# Patient Record
Sex: MALE | Race: BLACK OR AFRICAN AMERICAN | Employment: FULL TIME | URBAN - METROPOLITAN AREA
[De-identification: names, ages, dates, MRNs, and addresses within clinical notes are randomized per-mention and may not be internally consistent; named-entity substitution may affect disease eponyms.]

---

## 2021-08-09 ENCOUNTER — OFFICE VISIT (OUTPATIENT)
Dept: FAMILY MEDICINE CLINIC | Age: 41
End: 2021-08-09
Payer: COMMERCIAL

## 2021-08-09 VITALS
WEIGHT: 220 LBS | BODY MASS INDEX: 25.45 KG/M2 | DIASTOLIC BLOOD PRESSURE: 77 MMHG | TEMPERATURE: 98.7 F | OXYGEN SATURATION: 97 % | HEIGHT: 78 IN | HEART RATE: 82 BPM | SYSTOLIC BLOOD PRESSURE: 115 MMHG | RESPIRATION RATE: 16 BRPM

## 2021-08-09 DIAGNOSIS — Z76.89 ENCOUNTER TO ESTABLISH CARE: Primary | ICD-10-CM

## 2021-08-09 DIAGNOSIS — Z23 ENCOUNTER FOR IMMUNIZATION: ICD-10-CM

## 2021-08-09 DIAGNOSIS — K63.5 POLYP OF COLON, UNSPECIFIED PART OF COLON, UNSPECIFIED TYPE: ICD-10-CM

## 2021-08-09 DIAGNOSIS — Z11.59 NEED FOR HEPATITIS C SCREENING TEST: ICD-10-CM

## 2021-08-09 DIAGNOSIS — M25.461 SWELLING OF JOINT, KNEE, RIGHT: ICD-10-CM

## 2021-08-09 DIAGNOSIS — E55.9 VITAMIN D DEFICIENCY: ICD-10-CM

## 2021-08-09 DIAGNOSIS — B36.0 TINEA VERSICOLOR: ICD-10-CM

## 2021-08-09 PROCEDURE — 99204 OFFICE O/P NEW MOD 45 MIN: CPT | Performed by: FAMILY MEDICINE

## 2021-08-09 PROCEDURE — 90715 TDAP VACCINE 7 YRS/> IM: CPT | Performed by: FAMILY MEDICINE

## 2021-08-09 PROCEDURE — 90471 IMMUNIZATION ADMIN: CPT | Performed by: FAMILY MEDICINE

## 2021-08-09 RX ORDER — TRIAMCINOLONE ACETONIDE 55 UG/1
2 SPRAY, METERED NASAL DAILY
COMMUNITY
End: 2022-01-03

## 2021-08-09 RX ORDER — KETOCONAZOLE 20 MG/ML
SHAMPOO TOPICAL
Qty: 1 BOTTLE | Refills: 1 | Status: SHIPPED | OUTPATIENT
Start: 2021-08-09

## 2021-08-09 RX ORDER — LORATADINE 10 MG/1
10 TABLET ORAL
COMMUNITY
End: 2022-01-03

## 2021-08-09 NOTE — PROGRESS NOTES
1. Have you been to the ER, urgent care clinic since your last visit? Hospitalized since your last visit? No    2. Have you seen or consulted any other health care providers outside of the [address] [place] [address] since your last visit? Include any pap smears or colon screening.  No

## 2021-08-09 NOTE — PROGRESS NOTES
Ayde Sheffield is a 46 y.o. male who presents to the office today with the following:  Chief Complaint   Patient presents with   [address]     IBS not under control, refer to Gastroenterologist due to polyps, refer to dermatologist for fungus on skin, refer to PT for right knee, lived in [facility] for last 4 years and had speratic health care there       HPI   Establish care  BW  Not fasting today    Had Vit D def in the past    IBS  And Hx of colon polyps  Needs Colonoscopy every year    Right knee with decreased mobility since patellar tendon tear  And swelling  Can not run or jump  Wants to see Ortho first before PT    Rash  Tinea versicolor  Prev treated with shampoo      Review of Systems   Constitutional: Negative for weight loss. HENT: Negative for congestion. Eyes: Negative for blurred vision. Respiratory: Negative for cough. Cardiovascular: Negative for chest pain. Gastrointestinal: Positive for abdominal pain (little d/t IBS) and constipation. Negative for blood in stool, diarrhea and melena. Genitourinary: Negative. Negative for frequency. Skin: Positive for rash. Endo/Heme/Allergies: Negative for polydipsia. See HPI. Past Medical History:   Diagnosis Date    Colon polyps     H/O seasonal allergies     IBS (irritable bowel syndrome)     Patellar tendon rupture 05/2017    Tinea versicolor        Past Surgical History:   Procedure Laterality Date    HX COLONOSCOPY  08/2020    4 in the past 4 years due to polyps    HX ORTHOPAEDIC Right 2017    patellar tendon    HX UROLOGICAL  08/13/2013    vasectomy    HX UROLOGICAL      multiple benign lesion removed from Scrotum       No Known Allergies    Current Outpatient Medications   Medication Sig    loratadine (Claritin) 10 mg tablet Take 10 mg by mouth.  triamcinolone (NASACORT AQ) 55 mcg nasal inhaler 2 Sprays daily.     ketoconazole (NIZORAL) 2 % shampoo Apply daily for 10 min daily for a week then prn     No current facility-administered medications for this visit. Social History     Socioeconomic History    Marital status:      Spouse name: Not on file    Number of children: Not on file    Years of education: Not on file    Highest education level: Not on file   Tobacco Use    Smoking status: Never Smoker    Smokeless tobacco: Never Used   Vaping Use    Vaping Use: Never used   Substance and Sexual Activity    Alcohol use: Not Currently    Drug use: Not Currently     Types: Marijuana    Sexual activity: Yes     Partners: Female     Social Determinants of Health     Financial Resource Strain:     Difficulty of Paying Living Expenses:    Food Insecurity:     Worried About Running Out of Food in the Last Year:     [address] in the Last Year:    Transportation Needs:     Lack of Transportation (Medical):      Lack of Transportation (Non-Medical):    Physical Activity:     Days of Exercise per Week:     Minutes of Exercise per Session:    Stress:     Feeling of Stress :    Social Connections:     Frequency of Communication with Friends and Family:     Frequency of Social Gatherings with Friends and Family:     Attends [employer] Services:     Active Member of Clubs or Organizations:     Attends Club or Organization Meetings:     Marital Status:        Family History   Problem Relation Age of Onset    Cancer Mother         breast    No Known Problems Father     No Known Problems Sister     No Known Problems Brother     Hypertension Maternal Grandmother     Cancer Maternal Grandfather         colon    No Known Problems Paternal Grandmother     Cancer Paternal Grandfather         prostate         Physical Exam:  Visit Vitals  /77 (BP 1 Location: Left upper arm, BP Patient Position: Sitting, BP Cuff Size: Large adult)   Pulse 82   Temp 98.7 °F (37.1 °C) (Oral)   Resp 16   Ht 6' 6\" (1.981 m)   Wt 220 lb (99.8 kg)   SpO2 97%   BMI 25.42 kg/m²     Physical Exam  Vitals and nursing note reviewed. Constitutional:       Appearance: He is normal weight. HENT:      Head: Normocephalic and atraumatic. Right Ear: Tympanic membrane, ear canal and external ear normal.      Left Ear: Tympanic membrane, ear canal and external ear normal.   Eyes:      Extraocular Movements: Extraocular movements intact. Conjunctiva/sclera: Conjunctivae normal.      Pupils: Pupils are equal, round, and reactive to light. Cardiovascular:      Rate and Rhythm: Normal rate and regular rhythm. Pulses: Normal pulses. Heart sounds: Normal heart sounds. Pulmonary:      Effort: Pulmonary effort is normal.      Breath sounds: Normal breath sounds. Abdominal:      General: Abdomen is flat. There is no distension. Palpations: Abdomen is soft. Tenderness: There is no abdominal tenderness. There is no right CVA tenderness, left CVA tenderness or guarding. Musculoskeletal:         General: Swelling present. Right lower leg: No edema. Left lower leg: No edema. Comments: Right knee with mild swelling, no redness, crepitus present , decreased flexion   Lymphadenopathy:      Cervical: No cervical adenopathy. Skin:     General: Skin is warm and dry. Neurological:      Mental Status: He is alert and oriented to person, place, and time. Psychiatric:         Mood and Affect: Mood normal.         Behavior: Behavior normal.         Assessment/Plan:    ICD-10-CM ICD-9-CM    1. Encounter to establish care  Z76.89 V65.8 LIPID PANEL      CBC WITH AUTOMATED DIFF      METABOLIC PANEL, COMPREHENSIVE   2. Polyp of colon, unspecified part of colon, unspecified type  K63.5 211.3 REFERRAL TO GASTROENTEROLOGY  Advised to increase fiber in diet   3. Tinea versicolor  B36.0 111.0 REFERRAL TO DERMATOLOGY      ketoconazole (NIZORAL) 2 % shampoo   4. Swelling of joint, knee, right  M25.461 719.06 REFERRAL TO ORTHOPEDICS   5. Need for hepatitis C screening test  Z11.59 V73.89 HEPATITIS C AB   6. Vitamin D deficiency  E55.9 268.9 VITAMIN D, 25 HYDROXY   7.  Encounter for immunization  Z23 V03.89 TETANUS, DIPHTHERIA TOXOIDS AND ACELLULAR PERTUSSIS VACCINE (TDAP), IN INDIVIDS. >=7, IM           Mehul Vásquez MD

## 2021-08-10 LAB
25(OH)D3 SERPL-MCNC: 25.2 NG/ML (ref 30–100)
ALBUMIN SERPL-MCNC: 4.3 G/DL (ref 3.5–5)
ALBUMIN/GLOB SERPL: 1.3 {RATIO} (ref 1.1–2.2)
ALP SERPL-CCNC: 76 U/L (ref 45–117)
ALT SERPL-CCNC: 28 U/L (ref 12–78)
ANION GAP SERPL CALC-SCNC: 3 MMOL/L (ref 5–15)
AST SERPL-CCNC: 28 U/L (ref 15–37)
BASOPHILS # BLD: 0 K/UL (ref 0–0.1)
BASOPHILS NFR BLD: 1 % (ref 0–1)
BILIRUB SERPL-MCNC: 1 MG/DL (ref 0.2–1)
BUN SERPL-MCNC: 12 MG/DL (ref 6–20)
BUN/CREAT SERPL: 10 (ref 12–20)
CALCIUM SERPL-MCNC: 9.4 MG/DL (ref 8.5–10.1)
CHLORIDE SERPL-SCNC: 102 MMOL/L (ref 97–108)
CHOLEST SERPL-MCNC: 237 MG/DL
CO2 SERPL-SCNC: 33 MMOL/L (ref 21–32)
CREAT SERPL-MCNC: 1.21 MG/DL (ref 0.7–1.3)
DIFFERENTIAL METHOD BLD: ABNORMAL
EOSINOPHIL # BLD: 0.4 K/UL (ref 0–0.4)
EOSINOPHIL NFR BLD: 6 % (ref 0–7)
ERYTHROCYTE [DISTWIDTH] IN BLOOD BY AUTOMATED COUNT: 12.8 % (ref 11.5–14.5)
GLOBULIN SER CALC-MCNC: 3.3 G/DL (ref 2–4)
GLUCOSE SERPL-MCNC: 104 MG/DL (ref 65–100)
HCT VFR BLD AUTO: 51.4 % (ref 36.6–50.3)
HCV AB SERPL QL IA: NONREACTIVE
HDLC SERPL-MCNC: 62 MG/DL
HDLC SERPL: 3.8 {RATIO} (ref 0–5)
HGB BLD-MCNC: 16 G/DL (ref 12.1–17)
IMM GRANULOCYTES # BLD AUTO: 0 K/UL (ref 0–0.04)
IMM GRANULOCYTES NFR BLD AUTO: 0 % (ref 0–0.5)
LDLC SERPL CALC-MCNC: 152 MG/DL (ref 0–100)
LYMPHOCYTES # BLD: 1.8 K/UL (ref 0.8–3.5)
LYMPHOCYTES NFR BLD: 24 % (ref 12–49)
MCH RBC QN AUTO: 29.3 PG (ref 26–34)
MCHC RBC AUTO-ENTMCNC: 31.1 G/DL (ref 30–36.5)
MCV RBC AUTO: 94.1 FL (ref 80–99)
MONOCYTES # BLD: 0.5 K/UL (ref 0–1)
MONOCYTES NFR BLD: 6 % (ref 5–13)
NEUTS SEG # BLD: 4.7 K/UL (ref 1.8–8)
NEUTS SEG NFR BLD: 63 % (ref 32–75)
NRBC # BLD: 0 K/UL (ref 0–0.01)
NRBC BLD-RTO: 0 PER 100 WBC
PLATELET # BLD AUTO: 203 K/UL (ref 150–400)
PMV BLD AUTO: 11.5 FL (ref 8.9–12.9)
POTASSIUM SERPL-SCNC: 3.6 MMOL/L (ref 3.5–5.1)
PROT SERPL-MCNC: 7.6 G/DL (ref 6.4–8.2)
RBC # BLD AUTO: 5.46 M/UL (ref 4.1–5.7)
SODIUM SERPL-SCNC: 138 MMOL/L (ref 136–145)
TRIGL SERPL-MCNC: 115 MG/DL (ref ?–150)
VLDLC SERPL CALC-MCNC: 23 MG/DL
WBC # BLD AUTO: 7.4 K/UL (ref 4.1–11.1)

## 2021-08-10 NOTE — PROGRESS NOTES
Call pt, the Hep C is negative  The Vit D is low, take an OTC Vit D 2000 U every day and recheck in 3 mo  The sugar, kidney, liver tests and electrolytes are ok  The CBC is ok  The Chol is elevated, avoid Chol rich foods and recheck in 3 mo

## 2021-08-16 NOTE — PROGRESS NOTES
I have called and talked to this patient. I have given him his lab results and Dr Kerry Olmedo recommendations. Patient verbalizes understanding.

## 2021-12-22 ENCOUNTER — VIRTUAL VISIT (OUTPATIENT)
Dept: FAMILY MEDICINE CLINIC | Age: 41
End: 2021-12-22
Payer: COMMERCIAL

## 2021-12-22 DIAGNOSIS — R05.9 COUGH IN ADULT: Primary | ICD-10-CM

## 2021-12-22 LAB — SARS-COV-2 POC: NEGATIVE

## 2021-12-22 PROCEDURE — 87426 SARSCOV CORONAVIRUS AG IA: CPT | Performed by: FAMILY MEDICINE

## 2021-12-22 PROCEDURE — 99213 OFFICE O/P EST LOW 20 MIN: CPT | Performed by: FAMILY MEDICINE

## 2021-12-22 RX ORDER — AZITHROMYCIN 250 MG/1
TABLET, FILM COATED ORAL
Qty: 6 TABLET | Refills: 0 | Status: SHIPPED | OUTPATIENT
Start: 2021-12-22 | End: 2021-12-27

## 2021-12-22 RX ORDER — FEXOFENADINE HCL AND PSEUDOEPHEDRINE HCI 60; 120 MG/1; MG/1
1 TABLET, EXTENDED RELEASE ORAL EVERY 12 HOURS
COMMUNITY

## 2021-12-22 RX ORDER — BENZONATATE 100 MG/1
100 CAPSULE ORAL
Qty: 21 CAPSULE | Refills: 1 | Status: SHIPPED | OUTPATIENT
Start: 2021-12-22 | End: 2021-12-29

## 2021-12-22 NOTE — PROGRESS NOTES
Thi Ng is a 46 y.o. male who was seen by synchronous (real-time) audio-video technology on 12/22/2021 for Sore Throat (  x 1 day    -    [phone])        Assessment & Plan:   Diagnoses and all orders for this visit:    1. Cough in adult  -     azithromycin (ZITHROMAX) 250 mg tablet; Take 2 tablets today, then take 1 tablet daily  -     benzonatate (TESSALON) 100 mg capsule; Take 1 Capsule by mouth three (3) times daily as needed for Cough for up to 7 days.  -     AMB POC SARS-COV-2      Results for orders placed or performed in visit on 12/22/21   AMB POC SARS-COV-2   Result Value Ref Range    SARS-COV-2 POC Negative Negative       Symptomatic rx. 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 Has had COVID shots including booster. Prior to Admission medications    Medication Sig Start Date End Date Taking? Authorizing Provider   fexofenadine-pseudoephedrine (Allegra-D 12 Hour)  mg Tb12 Take 1 Tablet by mouth every twelve (12) hours. Yes Provider, Historical   fluticasone propionate (FLONASE NA) by Nasal route. Yes Provider, Historical   ketoconazole (NIZORAL) 2 % shampoo Apply daily for 10 min daily for a week then prn 8/9/21  Yes Ned-Isela Zendejas MD   loratadine (Claritin) 10 mg tablet Take 10 mg by mouth. Patient not taking: Reported on 12/22/2021    Provider, Historical   triamcinolone (NASACORT AQ) 55 mcg nasal inhaler 2 Sprays daily.   Patient not taking: Reported on 12/22/2021    Provider, Historical         ROS    Objective:     Patient-Reported Vitals 12/22/2021   Patient-Reported Temperature 96.8        [INSTRUCTIONS:  \"[x]\" Indicates a positive item  \"[]\" Indicates a negative item  -- DELETE ALL ITEMS NOT EXAMINED]    Constitutional: [x] Appears well-developed and well-nourished [x] No apparent distress      [] Abnormal -     Mental status: [x] Alert and awake  [x] Oriented to person/place/time [x] Able to follow commands    [] Abnormal -     Eyes:   EOM    [x]  Normal    [] Abnormal -   Sclera  [x]  Normal    [] Abnormal -          Discharge [x]  None visible   [] Abnormal -     HENT: [x] Normocephalic, atraumatic  [] Abnormal -   [x] Mouth/Throat: Mucous membranes are moist    External Ears [x] Normal  [] Abnormal -    Neck: [x] No visualized mass [] Abnormal -     Pulmonary/Chest: [x] Respiratory effort normal   [x] No visualized signs of difficulty breathing or respiratory distress        [] Abnormal -      Musculoskeletal:   [x] Normal gait with no signs of ataxia         [x] Normal range of motion of neck        [] Abnormal -     Neurological:        [x] No Facial Asymmetry (Cranial nerve 7 motor function) (limited exam due to video visit)          [x] No gaze palsy        [] Abnormal -          Skin:        [x] No significant exanthematous lesions or discoloration noted on facial skin         [] Abnormal -            Psychiatric:       [x] Normal Affect [] Abnormal -        [x] No Hallucinations    Other pertinent observable physical exam findings:-        We discussed the expected course, resolution and complications of the diagnosis(es) in detail. Medication risks, benefits, costs, interactions, and alternatives were discussed as indicated. I advised him to contact the office if his condition worsens, changes or fails to improve as anticipated. He expressed understanding with the diagnosis(es) and plan. Shasta Spence, was evaluated through a synchronous (real-time) audio-video encounter. The patient (or guardian if applicable) is aware that this is a billable service. Verbal consent to proceed has been obtained within the past 12 months.  The visit was conducted pursuant to the emergency declaration under the [facility], [address] [place] [address] authority and the [employer] [facility] Act.  Patient identification was verified, and a caregiver was present when appropriate. The patient was located in a state where the provider was credentialed to provide care. Pili Cheney MD  1. Have you been to the ER, urgent care clinic since your last visit? Hospitalized since your last visit? Yes - [facility] - Colonoscopy 11/19/21     2. Have you seen or consulted any other health care providers outside of the [address] [place] [address] since your last visit? Include any pap smears or colon screening.   Yes - 10/5/21 - Dr Annita Cogan (Gastroenterology)

## 2022-01-03 ENCOUNTER — OFFICE VISIT (OUTPATIENT)
Dept: FAMILY MEDICINE CLINIC | Age: 42
End: 2022-01-03
Payer: COMMERCIAL

## 2022-01-03 VITALS
HEIGHT: 78 IN | DIASTOLIC BLOOD PRESSURE: 75 MMHG | TEMPERATURE: 98 F | SYSTOLIC BLOOD PRESSURE: 119 MMHG | WEIGHT: 220 LBS | OXYGEN SATURATION: 97 % | BODY MASS INDEX: 25.45 KG/M2 | RESPIRATION RATE: 14 BRPM | HEART RATE: 75 BPM

## 2022-01-03 DIAGNOSIS — E55.9 VITAMIN D DEFICIENCY: ICD-10-CM

## 2022-01-03 DIAGNOSIS — N61.1 ABSCESS OF BREAST, RIGHT: Primary | ICD-10-CM

## 2022-01-03 DIAGNOSIS — E78.5 HYPERLIPIDEMIA, UNSPECIFIED HYPERLIPIDEMIA TYPE: ICD-10-CM

## 2022-01-03 PROCEDURE — 99214 OFFICE O/P EST MOD 30 MIN: CPT | Performed by: FAMILY MEDICINE

## 2022-01-03 RX ORDER — CEPHALEXIN 500 MG/1
500 CAPSULE ORAL 3 TIMES DAILY
Qty: 30 CAPSULE | Refills: 0 | Status: SHIPPED | OUTPATIENT
Start: 2022-01-03 | End: 2022-01-13

## 2022-01-03 NOTE — PROGRESS NOTES
Victor Manuel Hunt is a 46 y.o. male who presents to the office today with the following:  Chief Complaint   Patient presents with    Breast Mass     R side, x 1 week       HPI  Right breast mass started a week ago  Sore and nipple tender  And red, no opening  Felt a dull pain when lying on it  Today a little better in am but  Getting worse overall    No Hx of MRSA or contact sport     Vit D def  Taking Vit D sometimes    Hyperlipidemia  Fasting for BW  Watched diet some  Not eating red meat anymore      ROS  See HPI. Past Medical History:   Diagnosis Date    Colon polyps     H/O seasonal allergies     IBS (irritable bowel syndrome)     Patellar tendon rupture 05/2017    Tinea versicolor        Past Surgical History:   Procedure Laterality Date    HX COLONOSCOPY  08/2020    4 in the past 4 years due to polyps    HX COLONOSCOPY  2021    HX ORTHOPAEDIC Right 2017    patellar tendon    HX ORTHOPAEDIC Right 09/2021    Knee    HX UROLOGICAL  08/13/2013    vasectomy    HX UROLOGICAL      multiple benign lesion removed from Scrotum       No Known Allergies    Current Outpatient Medications   Medication Sig    cephALEXin (KEFLEX) 500 mg capsule Take 1 Capsule by mouth three (3) times daily for 10 days.  fexofenadine-pseudoephedrine (Allegra-D 12 Hour)  mg Tb12 Take 1 Tablet by mouth every twelve (12) hours.  fluticasone propionate (FLONASE NA) by Nasal route.  ketoconazole (NIZORAL) 2 % shampoo Apply daily for 10 min daily for a week then prn     No current facility-administered medications for this visit.        Social History     Socioeconomic History    Marital status:    Tobacco Use    Smoking status: Never Smoker    Smokeless tobacco: Never Used   Vaping Use    Vaping Use: Never used   Substance and Sexual Activity    Alcohol use: Not Currently    Drug use: Not Currently     Types: Marijuana    Sexual activity: Yes     Partners: Female       Family History   Problem Relation Age of Onset    Cancer Mother         breast    No Known Problems Father     No Known Problems Sister     No Known Problems Brother     Hypertension Maternal Grandmother     Cancer Maternal Grandfather         colon    No Known Problems Paternal Grandmother     Cancer Paternal Grandfather         prostate         Physical Exam:  Visit Vitals  /75   Pulse 75   Temp 98 °F (36.7 °C)   Resp 14   Ht 6' 6\" (1.981 m)   Wt 220 lb (99.8 kg)   SpO2 97%   BMI 25.42 kg/m²     Physical Exam  Vitals reviewed. Constitutional:       Appearance: He is normal weight. HENT:      Head: Normocephalic and atraumatic. Eyes:      Extraocular Movements: Extraocular movements intact. Conjunctiva/sclera: Conjunctivae normal.   Cardiovascular:      Rate and Rhythm: Normal rate and regular rhythm. Heart sounds: Normal heart sounds. Pulmonary:      Effort: Pulmonary effort is normal.      Breath sounds: Normal breath sounds. Comments: Right breast with redness and swelling and mass about 1 cm, lateral to nipple, tender  Chest:       Musculoskeletal:      Right lower leg: No edema. Left lower leg: No edema. Neurological:      Mental Status: He is alert and oriented to person, place, and time. Psychiatric:         Mood and Affect: Mood normal.         Behavior: Behavior normal.         Assessment/Plan:    ICD-10-CM ICD-9-CM    1. Abscess of breast, right  N61.1 611.0 cephALEXin (KEFLEX) 500 mg capsule   2. Vitamin D deficiency  E55.9 268.9 VITAMIN D, 25 HYDROXY   3. Hyperlipidemia, unspecified hyperlipidemia type  E78.5 272.4 LIPID PANEL     F/U in 2 w if not better will have to order US      Follow-up and Dispositions    · Return in about 2 weeks (around 1/17/2022).          Demetria Doran MD

## 2022-01-05 LAB
25(OH)D3 SERPL-MCNC: 26.6 NG/ML (ref 30–100)
CHOLEST SERPL-MCNC: 244 MG/DL
HDLC SERPL-MCNC: 60 MG/DL
HDLC SERPL: 4.1 {RATIO} (ref 0–5)
LDLC SERPL CALC-MCNC: 162.8 MG/DL (ref 0–100)
TRIGL SERPL-MCNC: 106 MG/DL (ref ?–150)
VLDLC SERPL CALC-MCNC: 21.2 MG/DL

## 2022-01-05 NOTE — PROGRESS NOTES
I have called and talked to this patient and given him these lab results per Dr Moura Ped review. Patient verbalizes understanding.

## 2022-01-05 NOTE — PROGRESS NOTES
Call pt, the Chol is a little higher than before  And the good Chol lower  Try to continue a low Chol diet, avoiding animal fasts as in morillo and sausage and high fat dairy  The Vit D is a little higher but still too low  Make sure to take Vit D OTC 2000 U every day and recheck in 3 mo

## 2022-12-08 ENCOUNTER — OFFICE VISIT (OUTPATIENT)
Dept: FAMILY MEDICINE CLINIC | Age: 42
End: 2022-12-08
Payer: COMMERCIAL

## 2022-12-08 VITALS
BODY MASS INDEX: 26.52 KG/M2 | DIASTOLIC BLOOD PRESSURE: 77 MMHG | SYSTOLIC BLOOD PRESSURE: 121 MMHG | RESPIRATION RATE: 16 BRPM | HEIGHT: 78 IN | OXYGEN SATURATION: 98 % | WEIGHT: 229.2 LBS | HEART RATE: 71 BPM | TEMPERATURE: 98.1 F

## 2022-12-08 DIAGNOSIS — K58.1 IRRITABLE BOWEL SYNDROME WITH CONSTIPATION: ICD-10-CM

## 2022-12-08 DIAGNOSIS — E55.9 VITAMIN D DEFICIENCY: ICD-10-CM

## 2022-12-08 DIAGNOSIS — B36.0 TINEA VERSICOLOR: ICD-10-CM

## 2022-12-08 DIAGNOSIS — E78.5 HYPERLIPIDEMIA, UNSPECIFIED HYPERLIPIDEMIA TYPE: ICD-10-CM

## 2022-12-08 DIAGNOSIS — Z23 ENCOUNTER FOR IMMUNIZATION: ICD-10-CM

## 2022-12-08 DIAGNOSIS — L70.0 CLOSED FOLLICLE COMEDO: Primary | ICD-10-CM

## 2022-12-08 RX ORDER — KETOCONAZOLE 20 MG/ML
SHAMPOO, SUSPENSION TOPICAL
Qty: 1 EACH | Refills: 1 | Status: SHIPPED | OUTPATIENT
Start: 2022-12-08

## 2022-12-08 NOTE — PROGRESS NOTES
1. \"Have you been to the ER, urgent care clinic since your last visit? Hospitalized since your last visit? \" No    2. \"Have you seen or consulted any other health care providers outside of the [address] [place] [place] [address] since your last visit? \" Yes Where: Dentist and ortho at [facility] for his shoulder      3. For patients aged 39-70: Has the patient had a colonoscopy / FIT/ Cologuard? NA - based on age      If the patient is female:    4. For patients aged 41-77: Has the patient had a mammogram within the past 2 years? NA - based on age or sex      11. For patients aged 21-65: Has the patient had a pap smear?  NA - based on age or sex

## 2022-12-08 NOTE — PROGRESS NOTES
Oskar Peck is a 38 y.o. male who presents to the office today with the following:  Chief Complaint   Patient presents with    Lesion     Has a spot on his left upper thigh that has been there awhile and has scab over and keeps coming back       HPI    Spot for a year  Once scratched it off  But came back  Really small  Scabbing  Has not tried any    Needs Chol checked  Last time high  Not watching diet much  Needs recheck    Vit D def  Taking OTC 2000 mg bid now    Needs shampoo   Rash got better with it but coming back    Has IBS  Sees GI specialist Dr Joelle Monroe  Will set up apt with another GI specialist himself    Review of Systems   HENT:  Negative for congestion. Respiratory:  Negative for cough. Cardiovascular:  Negative for chest pain. Gastrointestinal:  Positive for abdominal pain (with IBS), blood in stool (sometimes very faint on paper) and constipation. Negative for diarrhea. Genitourinary:  Negative for hematuria. Skin:  Positive for rash. Neurological:  Negative for dizziness and headaches. See HPI. Past Medical History:   Diagnosis Date    Colon polyps     COVID-19 08/24/2022    H/O seasonal allergies     IBS (irritable bowel syndrome)     Patellar tendon rupture 05/2017    Tinea versicolor        Past Surgical History:   Procedure Laterality Date    HX COLONOSCOPY  08/2020    4 in the past 4 years due to polyps    HX COLONOSCOPY  2021    HX ORTHOPAEDIC Right 2017    patellar tendon    HX ORTHOPAEDIC Right 09/2021    Knee    HX UROLOGICAL  08/13/2013    vasectomy    HX UROLOGICAL      multiple benign lesion removed from Scrotum       No Known Allergies    Current Outpatient Medications   Medication Sig    ketoconazole (NIZORAL) 2 % shampoo Apply daily for 10 min daily for a week then prn    linaCLOtide (Linzess) 145 mcg cap capsule Take 1 Capsule by mouth daily.  Indications: irritable bowel syndrome with constipation    fexofenadine-pseudoephedrine (ALLEGRA-D)  mg Tb12 Take 1 Tablet by mouth every twelve (12) hours. fluticasone propionate (FLONASE NA) by Nasal route. No current facility-administered medications for this visit. Social History     Socioeconomic History    Marital status:    Tobacco Use    Smoking status: Never    Smokeless tobacco: Never   Vaping Use    Vaping Use: Never used   Substance and Sexual Activity    Alcohol use: Not Currently    Drug use: Not Currently     Types: Marijuana    Sexual activity: Yes     Partners: Female       Family History   Problem Relation Age of Onset    Cancer Mother         breast    No Known Problems Father     No Known Problems Sister     No Known Problems Brother     Hypertension Maternal Grandmother     Cancer Maternal Grandfather         colon    No Known Problems Paternal Grandmother     Cancer Paternal Grandfather         prostate         Physical Exam:  Visit Vitals  /77 (BP 1 Location: Right arm, BP Patient Position: Sitting, BP Cuff Size: Adult)   Pulse 71   Temp 98.1 °F (36.7 °C) (Temporal)   Resp 16   Ht 6' 6\" (1.981 m)   Wt 229 lb 3.2 oz (104 kg)   SpO2 98%   BMI 26.49 kg/m²     Physical Exam  Vitals and nursing note reviewed. HENT:      Head: Normocephalic and atraumatic. Eyes:      Extraocular Movements: Extraocular movements intact. Conjunctiva/sclera: Conjunctivae normal.   Cardiovascular:      Rate and Rhythm: Normal rate and regular rhythm. Heart sounds: Normal heart sounds. Pulmonary:      Effort: Pulmonary effort is normal.      Breath sounds: Normal breath sounds. Abdominal:      General: Abdomen is flat. There is no distension. Palpations: Abdomen is soft. Tenderness: There is no abdominal tenderness. There is no right CVA tenderness, left CVA tenderness or guarding. Musculoskeletal:      Right lower leg: No edema. Left lower leg: No edema. Skin:     General: Skin is warm and dry.       Comments: Left lateral thigh with plugged pore   Neurological: Mental Status: He is alert and oriented to person, place, and time. Psychiatric:         Mood and Affect: Mood normal.         Behavior: Behavior normal.       Assessment/Plan:    ICD-10-CM ICD-9-CM    1. Closed follicle comedo  Z51.2 299.0 Apply warm compresses  Pt reassured, benign  Watch out for irregular lesions with irregular color larger than 1 cm      2. Vitamin D deficiency  E55.9 268.9 VITAMIN D, 25 HYDROXY      3. Hyperlipidemia, unspecified hyperlipidemia type  E78.5 272.4 LIPID PANEL      CBC WITH AUTOMATED DIFF      METABOLIC PANEL, COMPREHENSIVE      4. Tinea versicolor  B36.0 111.0 ketoconazole (NIZORAL) 2 % shampoo      5. Irritable bowel syndrome with constipation  K58.1 564.1 linaCLOtide (Linzess) 145 mcg cap capsule      6.  Encounter for immunization  Z23 V03.89 INFLUENZA, FLUARIX, FLULAVAL, FLUZONE (AGE 6 MO+), AFLURIA(AGE 3Y+) IM, PF, 0.5 ML              Huma Smith MD

## 2022-12-09 LAB
25(OH)D3 SERPL-MCNC: 31.7 NG/ML (ref 30–100)
ALBUMIN SERPL-MCNC: 4.1 G/DL (ref 3.5–5)
ALBUMIN/GLOB SERPL: 1.4 {RATIO} (ref 1.1–2.2)
ALP SERPL-CCNC: 67 U/L (ref 45–117)
ALT SERPL-CCNC: 19 U/L (ref 12–78)
ANION GAP SERPL CALC-SCNC: 5 MMOL/L (ref 5–15)
AST SERPL-CCNC: 20 U/L (ref 15–37)
BASOPHILS # BLD: 0.1 K/UL (ref 0–0.1)
BASOPHILS NFR BLD: 1 % (ref 0–1)
BILIRUB SERPL-MCNC: 0.8 MG/DL (ref 0.2–1)
BUN SERPL-MCNC: 12 MG/DL (ref 6–20)
BUN/CREAT SERPL: 10 (ref 12–20)
CALCIUM SERPL-MCNC: 8.9 MG/DL (ref 8.5–10.1)
CHLORIDE SERPL-SCNC: 107 MMOL/L (ref 97–108)
CHOLEST SERPL-MCNC: 188 MG/DL
CO2 SERPL-SCNC: 28 MMOL/L (ref 21–32)
CREAT SERPL-MCNC: 1.21 MG/DL (ref 0.7–1.3)
DIFFERENTIAL METHOD BLD: NORMAL
EOSINOPHIL # BLD: 0.4 K/UL (ref 0–0.4)
EOSINOPHIL NFR BLD: 5 % (ref 0–7)
ERYTHROCYTE [DISTWIDTH] IN BLOOD BY AUTOMATED COUNT: 12.2 % (ref 11.5–14.5)
GLOBULIN SER CALC-MCNC: 3 G/DL (ref 2–4)
GLUCOSE SERPL-MCNC: 91 MG/DL (ref 65–100)
HCT VFR BLD AUTO: 43.1 % (ref 36.6–50.3)
HDLC SERPL-MCNC: 47 MG/DL
HDLC SERPL: 4 {RATIO} (ref 0–5)
HGB BLD-MCNC: 14.1 G/DL (ref 12.1–17)
IMM GRANULOCYTES # BLD AUTO: 0 K/UL (ref 0–0.04)
IMM GRANULOCYTES NFR BLD AUTO: 0 % (ref 0–0.5)
LDLC SERPL CALC-MCNC: 116.6 MG/DL (ref 0–100)
LYMPHOCYTES # BLD: 2.1 K/UL (ref 0.8–3.5)
LYMPHOCYTES NFR BLD: 28 % (ref 12–49)
MCH RBC QN AUTO: 29 PG (ref 26–34)
MCHC RBC AUTO-ENTMCNC: 32.7 G/DL (ref 30–36.5)
MCV RBC AUTO: 88.7 FL (ref 80–99)
MONOCYTES # BLD: 0.5 K/UL (ref 0–1)
MONOCYTES NFR BLD: 6 % (ref 5–13)
NEUTS SEG # BLD: 4.4 K/UL (ref 1.8–8)
NEUTS SEG NFR BLD: 60 % (ref 32–75)
NRBC # BLD: 0 K/UL (ref 0–0.01)
NRBC BLD-RTO: 0 PER 100 WBC
PLATELET # BLD AUTO: 211 K/UL (ref 150–400)
PMV BLD AUTO: 11.3 FL (ref 8.9–12.9)
POTASSIUM SERPL-SCNC: 3.7 MMOL/L (ref 3.5–5.1)
PROT SERPL-MCNC: 7.1 G/DL (ref 6.4–8.2)
RBC # BLD AUTO: 4.86 M/UL (ref 4.1–5.7)
SODIUM SERPL-SCNC: 140 MMOL/L (ref 136–145)
TRIGL SERPL-MCNC: 122 MG/DL (ref ?–150)
VLDLC SERPL CALC-MCNC: 24.4 MG/DL
WBC # BLD AUTO: 7.4 K/UL (ref 4.1–11.1)

## 2022-12-09 NOTE — PROGRESS NOTES
Call pt, the Vit D is low normal, cont current dose of Vit D  The electrolytes, kidney, liver tests and Glucose are normal  The CBC is normal  The Chol is good now, cont current low Chol diet

## 2022-12-21 ENCOUNTER — PATIENT MESSAGE (OUTPATIENT)
Dept: FAMILY MEDICINE CLINIC | Age: 42
End: 2022-12-21

## 2022-12-22 NOTE — TELEPHONE ENCOUNTER
Patient given all messages from Dr. Emmanuel Man. I advised him to be seen in ED since no better pain.

## 2023-01-24 ENCOUNTER — OFFICE VISIT (OUTPATIENT)
Dept: FAMILY MEDICINE CLINIC | Age: 43
End: 2023-01-24
Payer: COMMERCIAL

## 2023-01-24 VITALS
RESPIRATION RATE: 16 BRPM | SYSTOLIC BLOOD PRESSURE: 121 MMHG | OXYGEN SATURATION: 97 % | HEART RATE: 71 BPM | TEMPERATURE: 97.2 F | WEIGHT: 229.6 LBS | BODY MASS INDEX: 26.56 KG/M2 | HEIGHT: 78 IN | DIASTOLIC BLOOD PRESSURE: 80 MMHG

## 2023-01-24 DIAGNOSIS — G62.9 NEUROPATHY: Primary | ICD-10-CM

## 2023-01-24 PROCEDURE — 36415 COLL VENOUS BLD VENIPUNCTURE: CPT | Performed by: FAMILY MEDICINE

## 2023-01-24 PROCEDURE — 99213 OFFICE O/P EST LOW 20 MIN: CPT | Performed by: FAMILY MEDICINE

## 2023-01-24 RX ORDER — DICYCLOMINE HYDROCHLORIDE 20 MG/1
TABLET ORAL
COMMUNITY
Start: 2022-12-28 | End: 2023-01-24

## 2023-01-24 NOTE — PROGRESS NOTES
1. \"Have you been to the ER, urgent care clinic since your last visit? Hospitalized since your last visit? \" Yes Where: [facility]    2. \"Have you seen or consulted any other health care providers outside of the [address] [place] [address] since your last visit? \" No     3. For patients aged 39-70: Has the patient had a colonoscopy / FIT/ Cologuard? Yes - Care Gap present. Rooming MA/LPN to request most recent results  I have internally sent a request for Dr Larry Alejandro office in [place] to fax this patient's most recent colonoscopy. If the patient is female:    4. For patients aged 41-77: Has the patient had a mammogram within the past 2 years? NA - based on age or sex      11. For patients aged 21-65: Has the patient had a pap smear?  NA - based on age or sex

## 2023-01-24 NOTE — PROGRESS NOTES
Successful venipuncture in patient's right ac X 1 sticks. The patient tolerated this procedure w/o c/o pain or discomfort.

## 2023-01-24 NOTE — PROGRESS NOTES
Librado Day is a 38 y.o. male who presents to the office today with the following:  Chief Complaint   Patient presents with    Numbness     Both pinky toes go numb, more so on the left then the right. He noticed it a couple weeks ago. 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 Past Medical History:   Diagnosis Date    Colon polyps     COVID-19 08/24/2022    H/O seasonal allergies     IBS (irritable bowel syndrome)     Patellar tendon rupture 05/2017    Tinea versicolor        Past Surgical History:   Procedure Laterality Date    HX COLONOSCOPY  08/2020    4 in the past 4 years due to polyps    HX COLONOSCOPY  2021    HX ORTHOPAEDIC Right 2017    patellar tendon    HX ORTHOPAEDIC Right 09/2021    Knee    HX UROLOGICAL  08/13/2013    vasectomy    HX UROLOGICAL      multiple benign lesion removed from Scrotum       No Known Allergies    Current Outpatient Medications   Medication Sig    ketoconazole (NIZORAL) 2 % shampoo Apply daily for 10 min daily for a week then prn    fexofenadine-pseudoephedrine (ALLEGRA-D)  mg Tb12 Take 1 Tablet by mouth every twelve (12) hours. fluticasone propionate (FLONASE NA) by Nasal route. No current facility-administered medications for this visit.        Social History     Socioeconomic History    Marital status:    Tobacco Use    Smoking status: Never    Smokeless tobacco: Never   Vaping Use    Vaping Use: Never used   Substance and Sexual Activity    Alcohol use: Not Currently    Drug use: Not Currently     Types: Marijuana    Sexual activity: Yes     Partners: Female       Family History   Problem Relation Age of Onset    Cancer Mother         breast    No Known Problems Father     No Known Problems Sister     No Known Problems Brother     Hypertension Maternal Grandmother     Cancer Maternal Grandfather         colon    No Known Problems Paternal Grandmother     Cancer Paternal Grandfather         prostate         Physical Exam:  Visit Vitals  /80 (BP 1 Location: Right arm, BP Patient Position: Sitting, BP Cuff Size: Adult)   Pulse 71   Temp 97.2 °F (36.2 °C) (Temporal)   Resp 16   Ht 6' 6\" (1.981 m)   Wt 229 lb 9.6 oz (104.1 kg)   SpO2 97%   BMI 26.53 kg/m²     Physical Exam  Vitals and nursing note reviewed. HENT:      Head: Normocephalic and atraumatic. Eyes:      Extraocular Movements: Extraocular movements intact. Conjunctiva/sclera: Conjunctivae normal.   Cardiovascular:      Pulses: Normal pulses. Comments: Warm feet and normal dorsalis pedis, no blue or red toes, hair growth on toes present  Pulmonary:      Effort: Pulmonary effort is normal.   Musculoskeletal:      Right lower leg: No edema. Left lower leg: No edema. Skin:     General: Skin is warm and dry. Neurological:      Mental Status: He is alert and oriented to person, place, and time. Psychiatric:         Mood and Affect: Mood normal.         Behavior: Behavior normal.       Assessment/Plan:    ICD-10-CM ICD-9-CM    1.  Neuropathy  G62.9 355.9 VITAMIN B12        Advised to take OTC Vit B12 and   recheck in 1 mo if Blood level low  If still low in 1 mo needs injections  Also wear wide shoes and avoid pressure on toes    Cristina Pena MD

## 2023-01-24 NOTE — LETTER
1/24/2023 12:14 PM    Mr. Nehal Murillo  [address] [facility] [address]      RE:  Nehal Murillo   1980      Dear Dr Krista Pisano and staff;    Nehal Murillo is a patient of [facility] with Mary Carrero MD. Please fax this patient's most recent Colonoscopy so that we may update the patient's records for continuity of care. Our office number is [phone] if you should have any additional questions.      Our fax number: [phone]                          Sincerely,      Jane Torre MD

## 2023-01-25 LAB — VIT B12 SERPL-MCNC: 363 PG/ML (ref 193–986)
